# Patient Record
Sex: MALE | Race: WHITE | NOT HISPANIC OR LATINO | Employment: OTHER | URBAN - METROPOLITAN AREA
[De-identification: names, ages, dates, MRNs, and addresses within clinical notes are randomized per-mention and may not be internally consistent; named-entity substitution may affect disease eponyms.]

---

## 2018-02-07 ENCOUNTER — APPOINTMENT (OUTPATIENT)
Dept: RADIOLOGY | Facility: MEDICAL CENTER | Age: 43
End: 2018-02-07
Attending: FAMILY MEDICINE
Payer: COMMERCIAL

## 2018-02-07 DIAGNOSIS — M25.511 RIGHT SHOULDER PAIN, UNSPECIFIED CHRONICITY: ICD-10-CM

## 2018-02-07 PROCEDURE — 73221 MRI JOINT UPR EXTREM W/O DYE: CPT | Mod: RT

## 2021-03-03 ENCOUNTER — TELEPHONE (OUTPATIENT)
Dept: SCHEDULING | Facility: IMAGING CENTER | Age: 46
End: 2021-03-03

## 2021-04-01 ENCOUNTER — OFFICE VISIT (OUTPATIENT)
Dept: MEDICAL GROUP | Facility: LAB | Age: 46
End: 2021-04-01
Payer: COMMERCIAL

## 2021-04-01 VITALS
DIASTOLIC BLOOD PRESSURE: 70 MMHG | WEIGHT: 176 LBS | SYSTOLIC BLOOD PRESSURE: 98 MMHG | HEIGHT: 68 IN | HEART RATE: 51 BPM | BODY MASS INDEX: 26.67 KG/M2 | TEMPERATURE: 97.3 F | OXYGEN SATURATION: 99 %

## 2021-04-01 DIAGNOSIS — Z76.89 ENCOUNTER TO ESTABLISH CARE WITH NEW DOCTOR: ICD-10-CM

## 2021-04-01 DIAGNOSIS — R39.198 URINARY STREAM SLOWING: ICD-10-CM

## 2021-04-01 PROCEDURE — 99203 OFFICE O/P NEW LOW 30 MIN: CPT | Performed by: FAMILY MEDICINE

## 2021-04-01 ASSESSMENT — PATIENT HEALTH QUESTIONNAIRE - PHQ9: CLINICAL INTERPRETATION OF PHQ2 SCORE: 0

## 2021-04-01 NOTE — PROGRESS NOTES
CC: Here to establish care, concerns about change in urine stream would like to have PSA checked    HPI: New patient  Louis presents today to establish care, 45 years old male with no significant past medical history    1. Encounter to establish care with new doctor  Reviewed his past medical problems, past surgical history, family/social history, not taking any type of medication.  Patient said he is very active and he climbs mountains.  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    2. Urinary stream slowing  Noticed for the past few years that his urine stream has slowed down and sometimes he feels that he is dripping at the end and not completely evacuating his bladder, no history of prostate cancer in the family would like his PSA to be checked.  Patient denies any urinary tract infection symptoms like burning sensation or lower abdominal pain or discomfort or fever or GI symptoms.  Denies flank pain.  Denies blood in the urine    Patient Active Problem List    Diagnosis Date Noted   • Encounter to establish care with new doctor 04/01/2021       No current outpatient medications on file.     No current facility-administered medications for this visit.         Allergies as of 04/01/2021   • (No Known Allergies)        ROS: Denies any chest pain, Shortness of breath, Changes bowel or bladder, Lower extremity edema.    Physical Exam:  Gen.: Well-developed, well-nourished, no apparent distress,pleasant and cooperative with the examination  Skin:  Warm and dry with good turgor. No rashes or suspicious lesions in visible areas  Eye: PERRLA, conjunctiva and sclera clear, lids normal  HEENT: Normocephalic/atraumatic, sinuses nontender with palpation, TMs clear, nares patent with pink mucosa and clear rhinorrhea, lips without lesions, oropharynx clear.  Neck: Trachea midline,no masses or adenopathy  Thyroid: normal consistency and size. No masses or nodules. Not tender with palpation.  Cor: Regular rate and  rhythm without murmur, gallop or rub.  Lungs: Respirations unlabored.Clear to auscultation with equal breath sounds bilaterally. No wheezes, rhonchi.  Abdomen: Soft nontender without hepatosplenomegaly or masses appreciated, normoactive bowel sounds. No hernias.  Extremities: No cyanosis, clubbing or edema, Symmetrical without deformities or malformations. Pulses 2+ and symmetrical both upper and lower extremities  Lymphatic: No abnormal adenopathy of the neck groin or axillae.  Psych: Alert and oriented x 3.Normal affect, judgement,insight and memory.        Assessment and Plan.   45 y.o. male here to establish care    1. Encounter to establish care with new doctor  Reviewed medical history and ordered annual labs  - CBC WITH DIFFERENTIAL; Future  - Comp Metabolic Panel; Future  - TSH; Future  - FREE THYROXINE; Future  - Lipid Profile; Future    2. Urinary stream slowing  This is a chronic problem, unresolving.  We will check PSA.  - PROSTATE SPECIFIC AG      Please note that this dictation was created using voice recognition software. I have made every reasonable attempt to correct obvious errors but there may be errors of grammar and content that I may have overlooked prior to finalization of this note.

## 2021-04-10 ENCOUNTER — HOSPITAL ENCOUNTER (OUTPATIENT)
Dept: LAB | Facility: MEDICAL CENTER | Age: 46
End: 2021-04-10
Attending: FAMILY MEDICINE
Payer: COMMERCIAL

## 2021-04-10 DIAGNOSIS — Z76.89 ENCOUNTER TO ESTABLISH CARE WITH NEW DOCTOR: ICD-10-CM

## 2021-04-10 LAB
ALBUMIN SERPL BCP-MCNC: 4.5 G/DL (ref 3.2–4.9)
ALBUMIN/GLOB SERPL: 1.5 G/DL
ALP SERPL-CCNC: 46 U/L (ref 30–99)
ALT SERPL-CCNC: 18 U/L (ref 2–50)
ANION GAP SERPL CALC-SCNC: 13 MMOL/L (ref 7–16)
AST SERPL-CCNC: 19 U/L (ref 12–45)
BASOPHILS # BLD AUTO: 1.1 % (ref 0–1.8)
BASOPHILS # BLD: 0.07 K/UL (ref 0–0.12)
BILIRUB SERPL-MCNC: 0.2 MG/DL (ref 0.1–1.5)
BUN SERPL-MCNC: 17 MG/DL (ref 8–22)
CALCIUM SERPL-MCNC: 9.6 MG/DL (ref 8.5–10.5)
CHLORIDE SERPL-SCNC: 106 MMOL/L (ref 96–112)
CHOLEST SERPL-MCNC: 178 MG/DL (ref 100–199)
CO2 SERPL-SCNC: 24 MMOL/L (ref 20–33)
CREAT SERPL-MCNC: 1.01 MG/DL (ref 0.5–1.4)
EOSINOPHIL # BLD AUTO: 0.32 K/UL (ref 0–0.51)
EOSINOPHIL NFR BLD: 5.2 % (ref 0–6.9)
ERYTHROCYTE [DISTWIDTH] IN BLOOD BY AUTOMATED COUNT: 45.7 FL (ref 35.9–50)
FASTING STATUS PATIENT QL REPORTED: NORMAL
GLOBULIN SER CALC-MCNC: 3 G/DL (ref 1.9–3.5)
GLUCOSE SERPL-MCNC: 94 MG/DL (ref 65–99)
HCT VFR BLD AUTO: 45.1 % (ref 42–52)
HDLC SERPL-MCNC: 47 MG/DL
HGB BLD-MCNC: 15 G/DL (ref 14–18)
IMM GRANULOCYTES # BLD AUTO: 0.01 K/UL (ref 0–0.11)
IMM GRANULOCYTES NFR BLD AUTO: 0.2 % (ref 0–0.9)
LDLC SERPL CALC-MCNC: 122 MG/DL
LYMPHOCYTES # BLD AUTO: 2.35 K/UL (ref 1–4.8)
LYMPHOCYTES NFR BLD: 38.5 % (ref 22–41)
MCH RBC QN AUTO: 30.7 PG (ref 27–33)
MCHC RBC AUTO-ENTMCNC: 33.3 G/DL (ref 33.7–35.3)
MCV RBC AUTO: 92.2 FL (ref 81.4–97.8)
MONOCYTES # BLD AUTO: 0.87 K/UL (ref 0–0.85)
MONOCYTES NFR BLD AUTO: 14.3 % (ref 0–13.4)
NEUTROPHILS # BLD AUTO: 2.48 K/UL (ref 1.82–7.42)
NEUTROPHILS NFR BLD: 40.7 % (ref 44–72)
NRBC # BLD AUTO: 0 K/UL
NRBC BLD-RTO: 0 /100 WBC
PLATELET # BLD AUTO: 277 K/UL (ref 164–446)
PMV BLD AUTO: 10.4 FL (ref 9–12.9)
POTASSIUM SERPL-SCNC: 4.2 MMOL/L (ref 3.6–5.5)
PROT SERPL-MCNC: 7.5 G/DL (ref 6–8.2)
RBC # BLD AUTO: 4.89 M/UL (ref 4.7–6.1)
SODIUM SERPL-SCNC: 143 MMOL/L (ref 135–145)
T4 FREE SERPL-MCNC: 1.48 NG/DL (ref 0.93–1.7)
TRIGL SERPL-MCNC: 45 MG/DL (ref 0–149)
TSH SERPL DL<=0.005 MIU/L-ACNC: 2.13 UIU/ML (ref 0.38–5.33)
WBC # BLD AUTO: 6.1 K/UL (ref 4.8–10.8)

## 2021-04-10 PROCEDURE — 80061 LIPID PANEL: CPT

## 2021-04-10 PROCEDURE — 84443 ASSAY THYROID STIM HORMONE: CPT

## 2021-04-10 PROCEDURE — 80053 COMPREHEN METABOLIC PANEL: CPT

## 2021-04-10 PROCEDURE — 84439 ASSAY OF FREE THYROXINE: CPT

## 2021-04-10 PROCEDURE — 36415 COLL VENOUS BLD VENIPUNCTURE: CPT

## 2021-04-10 PROCEDURE — 85025 COMPLETE CBC W/AUTO DIFF WBC: CPT

## 2021-04-20 ENCOUNTER — TELEPHONE (OUTPATIENT)
Dept: MEDICAL GROUP | Facility: LAB | Age: 46
End: 2021-04-20

## 2021-04-20 DIAGNOSIS — R39.198 URINARY STREAM SLOWING: ICD-10-CM

## 2021-04-20 NOTE — TELEPHONE ENCOUNTER
I have placed another order for PSA, I am not sure why the first one was not drawn.  Thank you  ----- Message from Abbey Au R.N. sent at 4/19/2021 10:57 AM PDT -----  Patient informed of labs, educated on diet/lifestyle changes for high LDL, however it does not look like PSA was drawn, please advise if this can be re-drawn or if results are not yet available?    Thank you,  LISSETT Potter

## 2021-04-20 NOTE — TELEPHONE ENCOUNTER
Patient informed that PSA lab was somehow missed-- unclear why. Apologized to patient. Informed that a new lab order was placed for patient to complete. He is agreeable to plan.     LISSETT Potter

## 2021-04-29 ENCOUNTER — HOSPITAL ENCOUNTER (OUTPATIENT)
Dept: LAB | Facility: MEDICAL CENTER | Age: 46
End: 2021-04-29
Attending: FAMILY MEDICINE
Payer: COMMERCIAL

## 2021-04-29 DIAGNOSIS — R39.198 URINARY STREAM SLOWING: ICD-10-CM

## 2021-04-29 LAB — PSA SERPL-MCNC: 1.11 NG/ML (ref 0–4)

## 2021-04-29 PROCEDURE — 84153 ASSAY OF PSA TOTAL: CPT

## 2021-04-29 PROCEDURE — 36415 COLL VENOUS BLD VENIPUNCTURE: CPT

## 2022-02-07 ENCOUNTER — OFFICE VISIT (OUTPATIENT)
Dept: MEDICAL GROUP | Facility: LAB | Age: 47
End: 2022-02-07
Payer: COMMERCIAL

## 2022-02-07 ENCOUNTER — HOSPITAL ENCOUNTER (OUTPATIENT)
Dept: RADIOLOGY | Facility: MEDICAL CENTER | Age: 47
End: 2022-02-07
Attending: PHYSICIAN ASSISTANT
Payer: COMMERCIAL

## 2022-02-07 VITALS
TEMPERATURE: 97.2 F | WEIGHT: 176 LBS | HEART RATE: 72 BPM | DIASTOLIC BLOOD PRESSURE: 74 MMHG | SYSTOLIC BLOOD PRESSURE: 112 MMHG | RESPIRATION RATE: 16 BRPM | HEIGHT: 68 IN | BODY MASS INDEX: 26.67 KG/M2 | OXYGEN SATURATION: 97 %

## 2022-02-07 DIAGNOSIS — M79.671 RIGHT FOOT PAIN: ICD-10-CM

## 2022-02-07 PROCEDURE — 99213 OFFICE O/P EST LOW 20 MIN: CPT | Performed by: PHYSICIAN ASSISTANT

## 2022-02-07 PROCEDURE — 73620 X-RAY EXAM OF FOOT: CPT | Mod: RT

## 2022-02-07 ASSESSMENT — FIBROSIS 4 INDEX: FIB4 SCORE: 0.78

## 2022-02-07 NOTE — PROGRESS NOTES
"Subjective:     CC: Foot injury    HPI:   Louis here today with    Patient states that he was snowboarding 2/4/2022 and the edge of his board caught on ice, twisting the toe side of his board.  Patient states at that time he did did not have any pain and was also able to continue snowboarding for the rest the day.  Patient states that that night after taking for snowboard boots, he noticed pain with weightbearing on his right first digit of his foot.  It also later developed swelling and some slight bruising.  He has been using ice and ibuprofen with some pain control.      ROS:  Gen: no fevers/chills  Pulm: no sob, no cough  CV: no chest pain, no palpitations  : no dysuria  MSk: + Foot pain  Skin: no rash  Neuro: no headaches, no numbness/tingling    No current Epic-ordered outpatient medications on file.     No current Epic-ordered facility-administered medications on file.         Objective:     Exam:  /74   Pulse 72   Temp 36.2 °C (97.2 °F)   Resp 16   Ht 1.727 m (5' 8\")   Wt 79.8 kg (176 lb)   SpO2 97%   BMI 26.76 kg/m²  Body mass index is 26.76 kg/m².    Gen: Alert and oriented, No apparent distress.  Neck: Neck is supple without lymphadenopathy.  Lungs: Normal effort, CTA bilaterally, no wheezes, rhonchi, or rales  CV: Regular rate and rhythm. No murmurs, rubs, or gallops.  Ext: No clubbing, cyanosis, edema.  Right foot: Tender to palpation on the base of the right first toe with trace bruising and swelling noted.  Good cap refill.  Motor and sensation intact    Assessment & Plan:     46 y.o. male with the following -     1. Right foot pain  New problem, stable  Right foot x-ray ordered  Continue ibuprofen ice elevation.  Supportive shoes if weightbearing  Discussed red flags and indications for follow-up      I spent a total of 12 minutes with record review, exam, communication with the patient, communication with other providers, and documentation of this encounter.      Return for After " x-ray.    Please note that this dictation was created using voice recognition software. I have made every reasonable attempt to correct obvious errors, but there may be errors of grammar and possibly content that I did not discover before finalizing the note.

## 2022-06-02 ENCOUNTER — OFFICE VISIT (OUTPATIENT)
Dept: URGENT CARE | Facility: CLINIC | Age: 47
End: 2022-06-02
Payer: COMMERCIAL

## 2022-06-02 ENCOUNTER — APPOINTMENT (OUTPATIENT)
Dept: RADIOLOGY | Facility: IMAGING CENTER | Age: 47
End: 2022-06-02
Attending: NURSE PRACTITIONER
Payer: COMMERCIAL

## 2022-06-02 VITALS
HEIGHT: 69 IN | TEMPERATURE: 99 F | RESPIRATION RATE: 16 BRPM | BODY MASS INDEX: 25.92 KG/M2 | HEART RATE: 66 BPM | DIASTOLIC BLOOD PRESSURE: 78 MMHG | OXYGEN SATURATION: 97 % | SYSTOLIC BLOOD PRESSURE: 112 MMHG | WEIGHT: 175 LBS

## 2022-06-02 DIAGNOSIS — T14.8XXA: ICD-10-CM

## 2022-06-02 DIAGNOSIS — S62.639B OPEN FRACTURE OF TUFT OF DISTAL PHALANX OF FINGER: ICD-10-CM

## 2022-06-02 DIAGNOSIS — S67.194A CRUSHING INJURY OF RIGHT RING FINGER, INITIAL ENCOUNTER: ICD-10-CM

## 2022-06-02 PROCEDURE — 99214 OFFICE O/P EST MOD 30 MIN: CPT | Performed by: NURSE PRACTITIONER

## 2022-06-02 PROCEDURE — 73140 X-RAY EXAM OF FINGER(S): CPT | Mod: TC,FY,RT | Performed by: RADIOLOGY

## 2022-06-02 RX ORDER — IBUPROFEN 600 MG/1
600 TABLET ORAL EVERY 6 HOURS PRN
Qty: 30 TABLET | Refills: 0 | Status: SHIPPED | OUTPATIENT
Start: 2022-06-02

## 2022-06-02 RX ORDER — CEPHALEXIN 500 MG/1
500 CAPSULE ORAL 4 TIMES DAILY
Qty: 28 CAPSULE | Refills: 0 | Status: SHIPPED | OUTPATIENT
Start: 2022-06-02 | End: 2022-06-09

## 2022-06-02 RX ORDER — OXYCODONE HYDROCHLORIDE AND ACETAMINOPHEN 5; 325 MG/1; MG/1
1-2 TABLET ORAL EVERY 6 HOURS PRN
Qty: 15 TABLET | Refills: 0 | Status: SHIPPED | OUTPATIENT
Start: 2022-06-02 | End: 2022-06-05

## 2022-06-02 ASSESSMENT — FIBROSIS 4 INDEX: FIB4 SCORE: 0.78

## 2022-06-02 ASSESSMENT — ENCOUNTER SYMPTOMS
FEVER: 0
SENSORY CHANGE: 1
JOINT SWELLING: 1
NUMBNESS: 1

## 2022-06-02 NOTE — PATIENT INSTRUCTIONS
-NSAID's (ibuprofen) and tylenol as directed for pain and inflammation.   -RICE Therapy: Rest, Ice, Compression, Elevation  -Gently clean area with mild soap and water. Can shower. Do not soak wound in water (dishwater, swimming pool, lake or other bodies of water).   -Can apply a triple antibiotic ointment twice daily.  -Finger splint.      Follow up sooner for signs of infection (redness, red streaking, pus, foul odor, severe pain, increased swelling or fever), collection of blood under nail causing pressure or lifting of the nail, or any other concerns. Follow up emergently for severe uncontrolled pain, neurovascular compromise (decreased sensation, motion, or circulation).

## 2022-06-02 NOTE — PROGRESS NOTES
Finger xray    Subjective:     Louis Calvillo is a 46 y.o. male who presents for Finger Injury (Today, RT ring finger injury, due to finger being smashed on to a railing.)      States he crushed the tip of his right ring finger between a heavy object and railing. Numbness to tip. Pain 6/10. Tetnus within the last year.           Hand Injury  This is a new problem. The current episode started today. Associated symptoms include joint swelling and numbness. Pertinent negatives include no fever. The symptoms are aggravated by bending. He has tried nothing for the symptoms.       History reviewed. No pertinent past medical history.    Past Surgical History:   Procedure Laterality Date   • KNEE ARTHROSCOPY      acl   • RECONSTRUCTION, KNEE, ACL Left    • TONSILLECTOMY AND ADENOIDECTOMY     • VASECTOMY         Social History     Socioeconomic History   • Marital status: Single     Spouse name: Not on file   • Number of children: Not on file   • Years of education: Not on file   • Highest education level: Not on file   Occupational History     Comment: construction    Tobacco Use   • Smoking status: Never Smoker   • Smokeless tobacco: Never Used   Vaping Use   • Vaping Use: Never used   Substance and Sexual Activity   • Alcohol use: Yes     Alcohol/week: 3.0 oz     Types: 5 Cans of beer per week   • Drug use: Yes     Frequency: 1.0 times per week     Types: Marijuana   • Sexual activity: Yes     Partners: Female   Other Topics Concern   • Not on file   Social History Narrative   • Not on file     Social Determinants of Health     Financial Resource Strain: Not on file   Food Insecurity: Not on file   Transportation Needs: Not on file   Physical Activity: Not on file   Stress: Not on file   Social Connections: Not on file   Intimate Partner Violence: Not on file   Housing Stability: Not on file        Family History   Problem Relation Age of Onset   • Lung Disease Father         lung ca    • No Known Problems Mother      "    No Known Allergies    Review of Systems   Constitutional: Negative for fever.   Musculoskeletal: Positive for joint pain and joint swelling.   Neurological: Positive for sensory change and numbness.   All other systems reviewed and are negative.       Objective:   /78   Pulse 66   Temp 37.2 °C (99 °F) (Temporal)   Resp 16   Ht 1.753 m (5' 9\")   Wt 79.4 kg (175 lb)   SpO2 97%   BMI 25.84 kg/m²     Physical Exam  Vitals reviewed.   Constitutional:       General: He is not in acute distress.  Pulmonary:      Effort: Pulmonary effort is normal. No respiratory distress.   Musculoskeletal:         General: Swelling, tenderness and signs of injury present.      Right hand: Swelling, laceration, tenderness and bony tenderness present. Normal capillary refill.      Comments: Skin avulsion just proxial to distal nail matrix. Bruising and swelling to palmar aspect. Mild bruising to proximal nail bed, no collection of fluid or subungual hematoma. Tip warm to palpation.    Skin:     General: Skin is warm and dry.      Capillary Refill: Capillary refill takes less than 2 seconds.      Findings: Abrasion, bruising and laceration present.   Neurological:      General: No focal deficit present.      Mental Status: He is alert and oriented to person, place, and time.   Psychiatric:         Mood and Affect: Mood normal.         Behavior: Behavior normal.         Assessment/Plan:   1. Open fracture of tuft of distal phalanx of finger  - DX-FINGER(S) 2+ RIGHT; Future  - ibuprofen (MOTRIN) 600 MG Tab; Take 1 Tablet by mouth every 6 hours as needed for Moderate Pain or Inflammation.  Dispense: 30 Tablet; Refill: 0  - Referral to Hand Surgery  - cephALEXin (KEFLEX) 500 MG Cap; Take 1 Capsule by mouth 4 times a day for 7 days.  Dispense: 28 Capsule; Refill: 0  - oxyCODONE-acetaminophen (PERCOCET) 5-325 MG Tab; Take 1-2 Tablets by mouth every 6 hours as needed for Severe Pain for up to 3 days.  Dispense: 15 Tablet; Refill: " 0    2. Comminuted fracture of bone  - DX-FINGER(S) 2+ RIGHT; Future  - ibuprofen (MOTRIN) 600 MG Tab; Take 1 Tablet by mouth every 6 hours as needed for Moderate Pain or Inflammation.  Dispense: 30 Tablet; Refill: 0  - Referral to Hand Surgery  - oxyCODONE-acetaminophen (PERCOCET) 5-325 MG Tab; Take 1-2 Tablets by mouth every 6 hours as needed for Severe Pain for up to 3 days.  Dispense: 15 Tablet; Refill: 0    3. Crushing injury of right ring finger, initial encounter  - DX-FINGER(S) 2+ RIGHT; Future  - ibuprofen (MOTRIN) 600 MG Tab; Take 1 Tablet by mouth every 6 hours as needed for Moderate Pain or Inflammation.  Dispense: 30 Tablet; Refill: 0  - Referral to Hand Surgery  - cephALEXin (KEFLEX) 500 MG Cap; Take 1 Capsule by mouth 4 times a day for 7 days.  Dispense: 28 Capsule; Refill: 0  - oxyCODONE-acetaminophen (PERCOCET) 5-325 MG Tab; Take 1-2 Tablets by mouth every 6 hours as needed for Severe Pain for up to 3 days.  Dispense: 15 Tablet; Refill: 0    Other orders  - Consent for Opiate Prescription  -Wound care, Cleaned with NS, dressed with xeroform over avulsed tissue, and polysporin.   -NSAID's (ibuprofen) and tylenol as directed for pain and inflammation.   -RICE Therapy: Rest, Ice, Compression, Elevation  -Gently clean area with mild soap and water. Can shower. Do not soak wound in water (dishwater, swimming pool, lake or other bodies of water).   -Can apply a triple antibiotic ointment twice daily.  -Finger splint.      Follow up sooner for signs of infection (redness, red streaking, pus, foul odor, severe pain, increased swelling or fever), collection of blood under nail causing pressure or lifting of the nail,  or any other concerns. Follow up emergently for severe uncontrolled pain, neurovascular compromise (decreased sensation, motion, or circulation).    DX-FINGER(S) 2+ RIGHT    Result Date: 6/2/2022 6/2/2022 3:47 PM HISTORY/REASON FOR EXAM:  Pain/Deformity Following Trauma; 4th digit  TECHNIQUE/EXAM DESCRIPTION AND NUMBER OF VIEWS: 3 of the right fingers COMPARISON: None. FINDINGS: There is normal bony mineralization.  There is comminuted fracture of the distal tuft of the right fourth digit. There is overlying soft tissue swelling..     1.  Comminuted fracture of distal tuft of right fourth digit.    Contingent ABX. Discussed urgent f/u with hand.     Differential diagnosis, natural history, supportive care, and indications for immediate follow-up discussed.

## 2023-02-09 ENCOUNTER — OFFICE VISIT (OUTPATIENT)
Dept: MEDICAL GROUP | Facility: LAB | Age: 48
End: 2023-02-09
Payer: COMMERCIAL

## 2023-02-09 VITALS
SYSTOLIC BLOOD PRESSURE: 110 MMHG | DIASTOLIC BLOOD PRESSURE: 78 MMHG | RESPIRATION RATE: 16 BRPM | TEMPERATURE: 97.9 F | WEIGHT: 179 LBS | BODY MASS INDEX: 26.51 KG/M2 | OXYGEN SATURATION: 100 % | HEIGHT: 69 IN | HEART RATE: 68 BPM

## 2023-02-09 DIAGNOSIS — L98.9 SKIN LESION: ICD-10-CM

## 2023-02-09 DIAGNOSIS — E78.5 HYPERLIPIDEMIA, UNSPECIFIED HYPERLIPIDEMIA TYPE: ICD-10-CM

## 2023-02-09 DIAGNOSIS — Z12.11 COLON CANCER SCREENING: ICD-10-CM

## 2023-02-09 DIAGNOSIS — Z00.00 ANNUAL PHYSICAL EXAM: ICD-10-CM

## 2023-02-09 PROCEDURE — 99214 OFFICE O/P EST MOD 30 MIN: CPT | Performed by: FAMILY MEDICINE

## 2023-02-09 RX ORDER — METHYLPREDNISOLONE 4 MG/1
TABLET ORAL
COMMUNITY
Start: 2022-09-21 | End: 2023-02-09

## 2023-02-09 RX ORDER — CEPHALEXIN 250 MG/1
CAPSULE ORAL
COMMUNITY
End: 2023-02-09

## 2023-02-09 ASSESSMENT — PATIENT HEALTH QUESTIONNAIRE - PHQ9: CLINICAL INTERPRETATION OF PHQ2 SCORE: 0

## 2023-02-09 ASSESSMENT — FIBROSIS 4 INDEX: FIB4 SCORE: 0.79

## 2023-02-09 NOTE — PROGRESS NOTES
Chief Complaint:   Chief Complaint   Patient presents with    Annual Exam       HPI: Established patient  Louis Calvillo is a 47 y.o. male who presents for follow-up, discussed the following today:    1. Annual physical exam  Due for annual labs    2. Colon cancer screening  Denies changes in bowel habits, due for colon cancer screening    3. Hyperlipidemia, unspecified hyperlipidemia type  History of hyperlipidemia, not on statins.  Due to recheck labs.    4. Skin lesion    New concern,  Lesion started around couple of weeks ago, increasing in size rapidly in the forehead area, would like a referral to see dermatologist.  He said it is painful, he manipulated it but its not going away in fact the lesion is increasing in size rapidly.  And painful        Past medical history, family history, social history and medications reviewed and updated in the record.  Today  Current medications, problem list and allergies reviewed in EPIC today  Health maintenance topics are reviewed and updated.    Patient Active Problem List    Diagnosis Date Noted    Encounter to establish care with new doctor 04/01/2021     Family History   Problem Relation Age of Onset    Lung Disease Father         lung ca     No Known Problems Mother      Social History     Socioeconomic History    Marital status: Single     Spouse name: Not on file    Number of children: Not on file    Years of education: Not on file    Highest education level: Not on file   Occupational History     Comment: construction    Tobacco Use    Smoking status: Never    Smokeless tobacco: Never   Vaping Use    Vaping Use: Never used   Substance and Sexual Activity    Alcohol use: Yes     Alcohol/week: 3.0 oz     Types: 5 Cans of beer per week    Drug use: Yes     Frequency: 1.0 times per week     Types: Marijuana    Sexual activity: Yes     Partners: Female   Other Topics Concern    Not on file   Social History Narrative    Not on file     Social Determinants of Health  "    Financial Resource Strain: Not on file   Food Insecurity: Not on file   Transportation Needs: Not on file   Physical Activity: Not on file   Stress: Not on file   Social Connections: Not on file   Intimate Partner Violence: Not on file   Housing Stability: Not on file     Current Outpatient Medications   Medication Sig Dispense Refill    ibuprofen (MOTRIN) 600 MG Tab Take 1 Tablet by mouth every 6 hours as needed for Moderate Pain or Inflammation. 30 Tablet 0     No current facility-administered medications for this visit.           Review Of Systems  As documented in HPI above  PHYSICAL EXAMINATION:    /78 (BP Location: Left arm, Patient Position: Sitting, BP Cuff Size: Adult)   Pulse 68   Temp 36.6 °C (97.9 °F) (Temporal)   Resp 16   Ht 1.753 m (5' 9\")   Wt 81.2 kg (179 lb)   SpO2 100%   BMI 26.43 kg/m²   Gen.: Well-developed, well-nourished, no apparent distress, pleasant and cooperative with the examination  HEENT: Normocephalic/atraumatic, sinuses nontender with palpation, TMs clear, nares patent with pink mucosa and clear rhinorrhea, oropharynx clear  Neck: No JVD or bruits, no adenopathy  Cor: Regular rate and rhythm without murmur gallop or rub  Lungs: Clear to auscultation with equal breath sounds bilaterally. No wheezes, rhonchi.  Abdomen: Soft nontender without hepatosplenomegaly or masses appreciated, normoactive bowel sounds  Extremities: No cyanosis, clubbing or edema  Skin: Located in the forehead area, small pimple-like structure, tender/painful and with pointy center, firm in consistency.  Suggestive of possible wart    ASSESSMENT/Plan:  1. Annual physical exam  CBC WITH DIFFERENTIAL    Comp Metabolic Panel    TSH WITH REFLEX TO FT4    Lipid Profile      2. Colon cancer screening  Referral to Gastroenterology      3. Hyperlipidemia, unspecified hyperlipidemia type  Chronic, recheck lipid profile.  Will consider calcium cardiac score to assess cardiovascular risk  Referral to " Gastroenterology      4. Skin lesion  New concern, possibly wart.  Follow-up with dermatology as directed.  Referral to Dermatology      Please note that this dictation was created using voice recognition software. I have made every reasonable attempt to correct obvious errors but there may be errors of grammar and content that I may have overlooked prior to finalization of this note.

## 2023-02-15 ENCOUNTER — HOSPITAL ENCOUNTER (OUTPATIENT)
Dept: LAB | Facility: MEDICAL CENTER | Age: 48
End: 2023-02-15
Attending: FAMILY MEDICINE
Payer: COMMERCIAL

## 2023-02-15 ENCOUNTER — APPOINTMENT (RX ONLY)
Dept: URBAN - METROPOLITAN AREA CLINIC 6 | Facility: CLINIC | Age: 48
Setting detail: DERMATOLOGY
End: 2023-02-15

## 2023-02-15 DIAGNOSIS — D22 MELANOCYTIC NEVI: ICD-10-CM

## 2023-02-15 DIAGNOSIS — Z71.89 OTHER SPECIFIED COUNSELING: ICD-10-CM

## 2023-02-15 DIAGNOSIS — Z00.00 ANNUAL PHYSICAL EXAM: ICD-10-CM

## 2023-02-15 DIAGNOSIS — D18.0 HEMANGIOMA: ICD-10-CM

## 2023-02-15 DIAGNOSIS — L81.4 OTHER MELANIN HYPERPIGMENTATION: ICD-10-CM

## 2023-02-15 DIAGNOSIS — L81.3 CAFÉ AU LAIT SPOTS: ICD-10-CM

## 2023-02-15 DIAGNOSIS — L82.1 OTHER SEBORRHEIC KERATOSIS: ICD-10-CM

## 2023-02-15 DIAGNOSIS — L82.0 INFLAMED SEBORRHEIC KERATOSIS: ICD-10-CM

## 2023-02-15 PROBLEM — D23.71 OTHER BENIGN NEOPLASM OF SKIN OF RIGHT LOWER LIMB, INCLUDING HIP: Status: ACTIVE | Noted: 2023-02-15

## 2023-02-15 PROBLEM — D18.01 HEMANGIOMA OF SKIN AND SUBCUTANEOUS TISSUE: Status: ACTIVE | Noted: 2023-02-15

## 2023-02-15 PROBLEM — D23.72 OTHER BENIGN NEOPLASM OF SKIN OF LEFT LOWER LIMB, INCLUDING HIP: Status: ACTIVE | Noted: 2023-02-15

## 2023-02-15 PROBLEM — D22.5 MELANOCYTIC NEVI OF TRUNK: Status: ACTIVE | Noted: 2023-02-15

## 2023-02-15 LAB
ALBUMIN SERPL BCP-MCNC: 4.6 G/DL (ref 3.2–4.9)
ALBUMIN/GLOB SERPL: 1.5 G/DL
ALP SERPL-CCNC: 48 U/L (ref 30–99)
ALT SERPL-CCNC: 12 U/L (ref 2–50)
ANION GAP SERPL CALC-SCNC: 10 MMOL/L (ref 7–16)
AST SERPL-CCNC: 18 U/L (ref 12–45)
BASOPHILS # BLD AUTO: 0.9 % (ref 0–1.8)
BASOPHILS # BLD: 0.05 K/UL (ref 0–0.12)
BILIRUB SERPL-MCNC: 0.5 MG/DL (ref 0.1–1.5)
BUN SERPL-MCNC: 15 MG/DL (ref 8–22)
CALCIUM ALBUM COR SERPL-MCNC: 9.3 MG/DL (ref 8.5–10.5)
CALCIUM SERPL-MCNC: 9.8 MG/DL (ref 8.5–10.5)
CHLORIDE SERPL-SCNC: 108 MMOL/L (ref 96–112)
CHOLEST SERPL-MCNC: 164 MG/DL (ref 100–199)
CO2 SERPL-SCNC: 24 MMOL/L (ref 20–33)
CREAT SERPL-MCNC: 1.01 MG/DL (ref 0.5–1.4)
EOSINOPHIL # BLD AUTO: 0.22 K/UL (ref 0–0.51)
EOSINOPHIL NFR BLD: 4 % (ref 0–6.9)
ERYTHROCYTE [DISTWIDTH] IN BLOOD BY AUTOMATED COUNT: 46.5 FL (ref 35.9–50)
FASTING STATUS PATIENT QL REPORTED: NORMAL
GFR SERPLBLD CREATININE-BSD FMLA CKD-EPI: 91 ML/MIN/1.73 M 2
GLOBULIN SER CALC-MCNC: 3 G/DL (ref 1.9–3.5)
GLUCOSE SERPL-MCNC: 73 MG/DL (ref 65–99)
HCT VFR BLD AUTO: 46.1 % (ref 42–52)
HDLC SERPL-MCNC: 42 MG/DL
HGB BLD-MCNC: 15.1 G/DL (ref 14–18)
IMM GRANULOCYTES # BLD AUTO: 0.02 K/UL (ref 0–0.11)
IMM GRANULOCYTES NFR BLD AUTO: 0.4 % (ref 0–0.9)
LDLC SERPL CALC-MCNC: 114 MG/DL
LYMPHOCYTES # BLD AUTO: 2.04 K/UL (ref 1–4.8)
LYMPHOCYTES NFR BLD: 37.3 % (ref 22–41)
MCH RBC QN AUTO: 30.3 PG (ref 27–33)
MCHC RBC AUTO-ENTMCNC: 32.8 G/DL (ref 33.7–35.3)
MCV RBC AUTO: 92.6 FL (ref 81.4–97.8)
MONOCYTES # BLD AUTO: 0.57 K/UL (ref 0–0.85)
MONOCYTES NFR BLD AUTO: 10.4 % (ref 0–13.4)
NEUTROPHILS # BLD AUTO: 2.57 K/UL (ref 1.82–7.42)
NEUTROPHILS NFR BLD: 47 % (ref 44–72)
NRBC # BLD AUTO: 0 K/UL
NRBC BLD-RTO: 0 /100 WBC
PLATELET # BLD AUTO: 308 K/UL (ref 164–446)
PMV BLD AUTO: 10 FL (ref 9–12.9)
POTASSIUM SERPL-SCNC: 4.9 MMOL/L (ref 3.6–5.5)
PROT SERPL-MCNC: 7.6 G/DL (ref 6–8.2)
RBC # BLD AUTO: 4.98 M/UL (ref 4.7–6.1)
SODIUM SERPL-SCNC: 142 MMOL/L (ref 135–145)
TRIGL SERPL-MCNC: 38 MG/DL (ref 0–149)
TSH SERPL DL<=0.005 MIU/L-ACNC: 1.76 UIU/ML (ref 0.38–5.33)
WBC # BLD AUTO: 5.5 K/UL (ref 4.8–10.8)

## 2023-02-15 PROCEDURE — 80053 COMPREHEN METABOLIC PANEL: CPT

## 2023-02-15 PROCEDURE — 36415 COLL VENOUS BLD VENIPUNCTURE: CPT

## 2023-02-15 PROCEDURE — ? COUNSELING

## 2023-02-15 PROCEDURE — 99203 OFFICE O/P NEW LOW 30 MIN: CPT | Mod: 25

## 2023-02-15 PROCEDURE — 85025 COMPLETE CBC W/AUTO DIFF WBC: CPT

## 2023-02-15 PROCEDURE — ? LIQUID NITROGEN

## 2023-02-15 PROCEDURE — ? DIAGNOSIS COMMENT

## 2023-02-15 PROCEDURE — 17110 DESTRUCTION B9 LES UP TO 14: CPT

## 2023-02-15 PROCEDURE — 84443 ASSAY THYROID STIM HORMONE: CPT

## 2023-02-15 PROCEDURE — 80061 LIPID PANEL: CPT

## 2023-02-15 ASSESSMENT — LOCATION SIMPLE DESCRIPTION DERM
LOCATION SIMPLE: RIGHT BUTTOCK
LOCATION SIMPLE: LEFT UPPER BACK
LOCATION SIMPLE: LEFT HAND
LOCATION SIMPLE: ABDOMEN
LOCATION SIMPLE: LEFT CHEEK
LOCATION SIMPLE: LEFT FOREHEAD
LOCATION SIMPLE: CHEST
LOCATION SIMPLE: RIGHT HAND

## 2023-02-15 ASSESSMENT — LOCATION DETAILED DESCRIPTION DERM
LOCATION DETAILED: LEFT MEDIAL INFERIOR CHEST
LOCATION DETAILED: PERIUMBILICAL SKIN
LOCATION DETAILED: LEFT ULNAR DORSAL HAND
LOCATION DETAILED: LEFT INFERIOR MEDIAL FOREHEAD
LOCATION DETAILED: EPIGASTRIC SKIN
LOCATION DETAILED: RIGHT RADIAL DORSAL HAND
LOCATION DETAILED: STERNUM
LOCATION DETAILED: RIGHT BUTTOCK
LOCATION DETAILED: LEFT INFERIOR CENTRAL MALAR CHEEK
LOCATION DETAILED: LEFT SUPERIOR UPPER BACK

## 2023-02-15 ASSESSMENT — LOCATION ZONE DERM
LOCATION ZONE: FACE
LOCATION ZONE: HAND
LOCATION ZONE: TRUNK

## 2023-02-15 NOTE — PROCEDURE: LIQUID NITROGEN
Medical Necessity Information: It is in your best interest to select a reason for this procedure from the list below. All of these items fulfill various CMS LCD requirements except the new and changing color options.
Post-Care Instructions: I reviewed with the patient in detail post-care instructions. Patient is to wear sunprotection, and avoid picking at any of the treated lesions. Pt may apply Vaseline to crusted or scabbing areas.
Include Z78.9 (Other Specified Conditions Influencing Health Status) As An Associated Diagnosis?: No
Show Spray Paint Technique Variable?: Yes
Medical Necessity Clause: This procedure was medically necessary because the lesions that were treated were:
Detail Level: Detailed
Consent: The patient's consent was obtained including but not limited to risks of crusting, scabbing, blistering, scarring, darker or lighter pigmentary change, recurrence, incomplete removal and infection.
Spray Paint Text: The liquid nitrogen was applied to the skin utilizing a spray paint frosting technique.

## 2023-02-15 NOTE — PROCEDURE: DIAGNOSIS COMMENT
Render Risk Assessment In Note?: no
Comment: Advised if does not resolve, rtc for bx.
Detail Level: Simple

## 2024-07-11 ENCOUNTER — DOCUMENTATION (OUTPATIENT)
Dept: HEALTH INFORMATION MANAGEMENT | Facility: OTHER | Age: 49
End: 2024-07-11
Payer: COMMERCIAL

## 2024-07-11 ENCOUNTER — TELEPHONE (OUTPATIENT)
Dept: HEALTH INFORMATION MANAGEMENT | Facility: OTHER | Age: 49
End: 2024-07-11
Payer: COMMERCIAL

## 2024-07-11 ENCOUNTER — PATIENT MESSAGE (OUTPATIENT)
Dept: HEALTH INFORMATION MANAGEMENT | Facility: OTHER | Age: 49
End: 2024-07-11

## 2024-08-08 ENCOUNTER — TELEPHONE (OUTPATIENT)
Dept: HEALTH INFORMATION MANAGEMENT | Facility: OTHER | Age: 49
End: 2024-08-08
Payer: COMMERCIAL

## 2025-06-03 ENCOUNTER — APPOINTMENT (OUTPATIENT)
Dept: URBAN - METROPOLITAN AREA CLINIC 6 | Facility: CLINIC | Age: 50
Setting detail: DERMATOLOGY
End: 2025-06-03

## 2025-06-03 DIAGNOSIS — L81.3 CAFÉ AU LAIT SPOTS: ICD-10-CM

## 2025-06-03 DIAGNOSIS — B07.8 OTHER VIRAL WARTS: ICD-10-CM

## 2025-06-03 DIAGNOSIS — D22 MELANOCYTIC NEVI: ICD-10-CM

## 2025-06-03 DIAGNOSIS — Z71.89 OTHER SPECIFIED COUNSELING: ICD-10-CM

## 2025-06-03 DIAGNOSIS — L81.4 OTHER MELANIN HYPERPIGMENTATION: ICD-10-CM

## 2025-06-03 DIAGNOSIS — L82.1 OTHER SEBORRHEIC KERATOSIS: ICD-10-CM

## 2025-06-03 DIAGNOSIS — D18.0 HEMANGIOMA: ICD-10-CM

## 2025-06-03 DIAGNOSIS — L73.8 OTHER SPECIFIED FOLLICULAR DISORDERS: ICD-10-CM

## 2025-06-03 PROBLEM — D22.5 MELANOCYTIC NEVI OF TRUNK: Status: ACTIVE | Noted: 2025-06-03

## 2025-06-03 PROBLEM — D23.72 OTHER BENIGN NEOPLASM OF SKIN OF LEFT LOWER LIMB, INCLUDING HIP: Status: ACTIVE | Noted: 2025-06-03

## 2025-06-03 PROBLEM — D23.71 OTHER BENIGN NEOPLASM OF SKIN OF RIGHT LOWER LIMB, INCLUDING HIP: Status: ACTIVE | Noted: 2025-06-03

## 2025-06-03 PROBLEM — D22.71 MELANOCYTIC NEVI OF RIGHT LOWER LIMB, INCLUDING HIP: Status: ACTIVE | Noted: 2025-06-03

## 2025-06-03 PROBLEM — D18.01 HEMANGIOMA OF SKIN AND SUBCUTANEOUS TISSUE: Status: ACTIVE | Noted: 2025-06-03

## 2025-06-03 PROCEDURE — ? SUNSCREEN RECOMMENDATIONS

## 2025-06-03 PROCEDURE — ? DIAGNOSIS COMMENT

## 2025-06-03 PROCEDURE — ? OBSERVATION

## 2025-06-03 PROCEDURE — ? LIQUID NITROGEN

## 2025-06-03 PROCEDURE — ? PHOTO-DOCUMENTATION

## 2025-06-03 PROCEDURE — ? COUNSELING

## 2025-06-03 ASSESSMENT — LOCATION SIMPLE DESCRIPTION DERM
LOCATION SIMPLE: RIGHT CHEEK
LOCATION SIMPLE: RIGHT INDEX FINGER
LOCATION SIMPLE: RIGHT BUTTOCK
LOCATION SIMPLE: LEFT HAND
LOCATION SIMPLE: SCALP
LOCATION SIMPLE: CHEST
LOCATION SIMPLE: LEFT CHEEK
LOCATION SIMPLE: ABDOMEN
LOCATION SIMPLE: RIGHT HAND
LOCATION SIMPLE: RIGHT 3RD TOE

## 2025-06-03 ASSESSMENT — LOCATION ZONE DERM
LOCATION ZONE: SCALP
LOCATION ZONE: TRUNK
LOCATION ZONE: FINGER
LOCATION ZONE: HAND
LOCATION ZONE: FACE
LOCATION ZONE: TOE

## 2025-06-03 ASSESSMENT — LOCATION DETAILED DESCRIPTION DERM
LOCATION DETAILED: LEFT MEDIAL INFERIOR CHEST
LOCATION DETAILED: RIGHT CENTRAL MALAR CHEEK
LOCATION DETAILED: RIGHT MEDIAL 3RD TOE
LOCATION DETAILED: EPIGASTRIC SKIN
LOCATION DETAILED: RIGHT PROXIMAL PALMAR INDEX FINGER
LOCATION DETAILED: PERIUMBILICAL SKIN
LOCATION DETAILED: STERNUM
LOCATION DETAILED: RIGHT RADIAL DORSAL HAND
LOCATION DETAILED: RIGHT BUTTOCK
LOCATION DETAILED: LEFT INFERIOR CENTRAL MALAR CHEEK
LOCATION DETAILED: LEFT SUPERIOR FRONTAL SCALP
LOCATION DETAILED: LEFT ULNAR DORSAL HAND